# Patient Record
Sex: FEMALE | Race: WHITE | NOT HISPANIC OR LATINO | URBAN - METROPOLITAN AREA
[De-identification: names, ages, dates, MRNs, and addresses within clinical notes are randomized per-mention and may not be internally consistent; named-entity substitution may affect disease eponyms.]

---

## 2018-12-05 ENCOUNTER — OFFICE VISIT (OUTPATIENT)
Dept: ENDOCRINOLOGY | Facility: CLINIC | Age: 53
End: 2018-12-05

## 2018-12-05 VITALS
SYSTOLIC BLOOD PRESSURE: 127 MMHG | OXYGEN SATURATION: 99 % | DIASTOLIC BLOOD PRESSURE: 88 MMHG | HEART RATE: 79 BPM | BODY MASS INDEX: 23.95 KG/M2 | HEIGHT: 68 IN | WEIGHT: 158 LBS

## 2018-12-05 DIAGNOSIS — R76.8 THYROID ANTIBODY POSITIVE: Primary | ICD-10-CM

## 2018-12-05 DIAGNOSIS — R25.1 TREMOR, UNSPECIFIED: ICD-10-CM

## 2018-12-05 DIAGNOSIS — Z86.39 H/O GRAVES' DISEASE: ICD-10-CM

## 2018-12-05 LAB
T4 FREE SERPL-MCNC: 1.14 NG/DL (ref 0.89–1.76)
TSH SERPL DL<=0.05 MIU/L-ACNC: 2.18 MIU/ML (ref 0.35–5.35)

## 2018-12-05 PROCEDURE — 84439 ASSAY OF FREE THYROXINE: CPT | Performed by: INTERNAL MEDICINE

## 2018-12-05 PROCEDURE — 84443 ASSAY THYROID STIM HORMONE: CPT | Performed by: INTERNAL MEDICINE

## 2018-12-05 PROCEDURE — 99243 OFF/OP CNSLTJ NEW/EST LOW 30: CPT | Performed by: INTERNAL MEDICINE

## 2018-12-05 RX ORDER — METOPROLOL SUCCINATE 25 MG/1
25 TABLET, EXTENDED RELEASE ORAL DAILY
Refills: 1 | COMMUNITY
Start: 2018-11-30

## 2018-12-05 NOTE — PROGRESS NOTES
Chief Complaint   Patient presents with   • positive thyroid Abs     Consult for Dr. Silva Leavitt       HPI:   Alvina Aguiar is a 53 y.o.female sent in consultation by Silva Leavitt MD for further evaluation of further evaluation of positive thyroid Abs . Her history is as follows:    - pt has a h/o Graves disease treated with PTU in 2000 after the birth of her first child. She took PTU for approximately 1 year.  - She then had recurrence of the Graves hyperthyroidism after the birth of her second child in 2002. She again was treat with PTU for approximately 1 year.  - She recalls her TFT's have been normal since stopping the PTU in ~ 2003.     - Earlier in this year, she began having palpitations at night with her pulse in the 120's. She wore a Holter monitor which did not show a dysrhythmia. The palpitations do not occur during the day.    Recent Labs: (06/08/2018) TSH 3.60, free T4 0.99, TPO Ab 373    On further review, pt denies changes in weight, denies changes in bowel habits. She has noted tremor of her head while upright that has been occurring intermittently for at least the past 6 months. The tremor does not occur in her hand.    - she has had fewer menstrual cycles this year and is now having hot flashes at night  - had shingles over Thanksgiving    Review of Systems   Constitutional: Negative.    HENT: Negative.    Eyes: Negative.    Respiratory: Negative.    Cardiovascular: Positive for palpitations.   Gastrointestinal: Negative.    Endocrine:        Hot flashes   Genitourinary: Negative.    Musculoskeletal: Negative.    Skin: Negative.    Allergic/Immunologic: Negative.    Neurological: Negative.    Hematological: Negative.    Psychiatric/Behavioral: Negative.        Past Medical History:   Diagnosis Date   • Graves disease    • Hypertension    • Skin cancer      family history includes Cancer in her brother; Graves' disease in her maternal aunt; Hypertension in her father; Rheum arthritis in her  "sister.  Past Surgical History:   Procedure Laterality Date   • APPENDECTOMY  1973    as a child      Social History     Tobacco Use   • Smoking status: Never Smoker   • Smokeless tobacco: Never Used   Substance Use Topics   • Alcohol use: No     Frequency: Never   • Drug use: Defer       Current Outpatient Medications:   •  metoprolol succinate XL (TOPROL-XL) 25 MG 24 hr tablet, Take 25 mg by mouth Daily., Disp: , Rfl: 1  No Known Allergies    /88   Pulse 79   Ht 172.7 cm (68\")   Wt 71.7 kg (158 lb)   LMP  (LMP Unknown)   SpO2 99%   BMI 24.02 kg/m²   Physical Exam   Constitutional: She is oriented to person, place, and time. She appears well-developed. No distress.   HENT:   Head: Normocephalic.   Mouth/Throat: Oropharynx is clear and moist.   Eyes: Conjunctivae and EOM are normal. Pupils are equal, round, and reactive to light.   Neck: No tracheal deviation present. Thyromegaly (mild) present.   No palpable thyroid nodules     Cardiovascular: Normal rate, regular rhythm and normal heart sounds.   No murmur heard.  Pulmonary/Chest: Effort normal and breath sounds normal. No respiratory distress.   Abdominal: Soft. Bowel sounds are normal. She exhibits no mass. There is no tenderness.   Lymphadenopathy:     She has no cervical adenopathy.   Neurological: She is alert and oriented to person, place, and time. No cranial nerve deficit.   Pt noted to have tremor of the head for a few seconds multiple times during the visit   Skin: Skin is warm and dry. She is not diaphoretic. No erythema.   Psychiatric: She has a normal mood and affect. Her behavior is normal.   Vitals reviewed.    LABS/IMAGING: outside records reviewed and summarized in HPI  Results for orders placed or performed in visit on 12/05/18   TSH   Result Value Ref Range    TSH 2.184 0.350 - 5.350 mIU/mL   T4, Free   Result Value Ref Range    Free T4 1.14 0.89 - 1.76 ng/dL     ASSESSMENT/PLAN:  1) positive thyroid antibodies, 2) h/o Graves " disease:  - d/w patient that the TPO Abs present in 06/2018 are a reflection of her underlying autoimmune thyroid disease. The Abs will fluctuate in amount and may always be present. Reassured pt that her TFTs would have to be consistent with hyperthyroidism to cause tachycardia/palpitations.  - Her TFT's were normal today  - Pt discussed with me that her symptoms may be related to perimenopause, which is possible given her age, irregular menses, and occurrence of palpitations at night when her hot flashes occur.    3) tremor, unspecified:    - Pt noted to have tremor of the head for a few seconds multiple times during the visit. She states that this has been occurring for a few months and has also been observed by her . I discussed with Ms. Yanez that while this may be benign essential tremor, I would like her to discuss this finding with her PCP, especially if it worsens.     Instructed pt to RTC as needed